# Patient Record
Sex: MALE
[De-identification: names, ages, dates, MRNs, and addresses within clinical notes are randomized per-mention and may not be internally consistent; named-entity substitution may affect disease eponyms.]

---

## 2017-01-23 ENCOUNTER — APPOINTMENT (OUTPATIENT)
Dept: HEMATOLOGY ONCOLOGY | Facility: CLINIC | Age: 32
End: 2017-01-23

## 2017-01-23 ENCOUNTER — LABORATORY RESULT (OUTPATIENT)
Age: 32
End: 2017-01-23

## 2017-01-23 VITALS
BODY MASS INDEX: 27.74 KG/M2 | DIASTOLIC BLOOD PRESSURE: 76 MMHG | HEIGHT: 68 IN | SYSTOLIC BLOOD PRESSURE: 135 MMHG | HEART RATE: 80 BPM | WEIGHT: 183 LBS | TEMPERATURE: 98.2 F

## 2017-01-24 ENCOUNTER — LABORATORY RESULT (OUTPATIENT)
Age: 32
End: 2017-01-24

## 2017-01-30 LAB
ALBUMIN SERPL ELPH-MCNC: 4.3 G/DL
ALP BLD-CCNC: 55 U/L
ALT SERPL-CCNC: 19 U/L
ANION GAP SERPL CALC-SCNC: 14 MMOL/L
AST SERPL-CCNC: 23 U/L
BILIRUB SERPL-MCNC: 0.2 MG/DL
BUN SERPL-MCNC: 24 MG/DL
CALCIUM SERPL-MCNC: 9.4 MG/DL
CHLORIDE SERPL-SCNC: 101 MMOL/L
CO2 SERPL-SCNC: 26 MMOL/L
CREAT SERPL-MCNC: 1.3 MG/DL
EPO SERPL-MCNC: 9.7 MIU/ML
GLUCOSE SERPL-MCNC: 97 MG/DL
HCT VFR BLD CALC: 48.9 %
HGB BLD-MCNC: 16.1 G/DL
MCHC RBC-ENTMCNC: 30.2 PG
MCHC RBC-ENTMCNC: 33 GM/DL
MCV RBC AUTO: 91.5 FL
PLATELET # BLD AUTO: 199 K/UL
POTASSIUM SERPL-SCNC: 4.5 MMOL/L
PROT SERPL-MCNC: 7.1 G/DL
RBC # BLD: 5.34 M/UL
RBC # FLD: 11.7 %
SODIUM SERPL-SCNC: 141 MMOL/L
TESTOST SERPL-MCNC: 619.2 NG/DL
WBC # FLD AUTO: 11.2 K/UL

## 2017-01-31 ENCOUNTER — APPOINTMENT (OUTPATIENT)
Dept: HEMATOLOGY ONCOLOGY | Facility: CLINIC | Age: 32
End: 2017-01-31

## 2017-01-31 VITALS
TEMPERATURE: 98.2 F | WEIGHT: 184 LBS | HEART RATE: 61 BPM | BODY MASS INDEX: 27.89 KG/M2 | SYSTOLIC BLOOD PRESSURE: 127 MMHG | DIASTOLIC BLOOD PRESSURE: 69 MMHG | HEIGHT: 68 IN

## 2017-01-31 DIAGNOSIS — Z86.69 PERSONAL HISTORY OF OTHER DISEASES OF THE NERVOUS SYSTEM AND SENSE ORGANS: ICD-10-CM

## 2017-01-31 DIAGNOSIS — R20.2 PARESTHESIA OF SKIN: ICD-10-CM

## 2017-01-31 DIAGNOSIS — B36.0 PITYRIASIS VERSICOLOR: ICD-10-CM

## 2017-01-31 DIAGNOSIS — N50.819 TESTICULAR PAIN, UNSPECIFIED: ICD-10-CM

## 2017-01-31 DIAGNOSIS — Z87.39 PERSONAL HISTORY OF OTHER DISEASES OF THE MUSCULOSKELETAL SYSTEM AND CONNECTIVE TISSUE: ICD-10-CM

## 2017-01-31 DIAGNOSIS — R10.30 LOWER ABDOMINAL PAIN, UNSPECIFIED: ICD-10-CM

## 2017-01-31 DIAGNOSIS — Z86.2 PERSONAL HISTORY OF DISEASES OF THE BLOOD AND BLOOD-FORMING ORGANS AND CERTAIN DISORDERS INVOLVING THE IMMUNE MECHANISM: ICD-10-CM

## 2020-03-23 ENCOUNTER — TRANSCRIPTION ENCOUNTER (OUTPATIENT)
Age: 35
End: 2020-03-23

## 2020-08-18 ENCOUNTER — TRANSCRIPTION ENCOUNTER (OUTPATIENT)
Age: 35
End: 2020-08-18

## 2024-06-06 ENCOUNTER — EMERGENCY (EMERGENCY)
Facility: HOSPITAL | Age: 39
LOS: 1 days | Discharge: HOME | End: 2024-06-06
Attending: STUDENT IN AN ORGANIZED HEALTH CARE EDUCATION/TRAINING PROGRAM
Payer: SELF-PAY

## 2024-06-06 VITALS
OXYGEN SATURATION: 93 % | HEART RATE: 99 BPM | TEMPERATURE: 98 F | DIASTOLIC BLOOD PRESSURE: 87 MMHG | RESPIRATION RATE: 17 BRPM | WEIGHT: 199.96 LBS | SYSTOLIC BLOOD PRESSURE: 136 MMHG | HEIGHT: 68 IN

## 2024-06-06 VITALS
OXYGEN SATURATION: 95 % | HEART RATE: 83 BPM | DIASTOLIC BLOOD PRESSURE: 75 MMHG | TEMPERATURE: 98 F | SYSTOLIC BLOOD PRESSURE: 119 MMHG | RESPIRATION RATE: 18 BRPM

## 2024-06-06 RX ORDER — ACETAMINOPHEN 500 MG
975 TABLET ORAL ONCE
Refills: 0 | Status: COMPLETED | OUTPATIENT
Start: 2024-06-06 | End: 2024-06-06

## 2024-06-06 RX ORDER — METHOCARBAMOL 500 MG/1
2 TABLET, FILM COATED ORAL
Qty: 16 | Refills: 0
Start: 2024-06-06 | End: 2024-06-07

## 2024-06-06 RX ORDER — METHOCARBAMOL 500 MG/1
1500 TABLET, FILM COATED ORAL ONCE
Refills: 0 | Status: COMPLETED | OUTPATIENT
Start: 2024-06-06 | End: 2024-06-06

## 2024-06-06 RX ORDER — LIDOCAINE 4 G/100G
1 CREAM TOPICAL ONCE
Refills: 0 | Status: COMPLETED | OUTPATIENT
Start: 2024-06-06 | End: 2024-06-06

## 2024-06-06 RX ORDER — IBUPROFEN 200 MG
600 TABLET ORAL ONCE
Refills: 0 | Status: COMPLETED | OUTPATIENT
Start: 2024-06-06 | End: 2024-06-06

## 2024-06-06 RX ADMIN — LIDOCAINE 1 PATCH: 4 CREAM TOPICAL at 15:55

## 2024-06-06 RX ADMIN — METHOCARBAMOL 1500 MILLIGRAM(S): 500 TABLET, FILM COATED ORAL at 15:55

## 2024-06-06 RX ADMIN — Medication 600 MILLIGRAM(S): at 15:55

## 2024-06-06 RX ADMIN — Medication 975 MILLIGRAM(S): at 15:55

## 2024-06-06 NOTE — ED PROVIDER NOTE - PATIENT PORTAL LINK FT
You can access the FollowMyHealth Patient Portal offered by [facility] by registering at the following website: [email or web address]. By joining [facility]’s FollowMyHealth portal, you will also be able to view your health information using other applications (apps) compatible with our system.

## 2024-06-06 NOTE — ED PROVIDER NOTE - ATTENDING APP SHARED VISIT CONTRIBUTION OF CARE
I, Guy Garcia, have performed a history and physical exam on this patient, and discussed their management with the FISH. I have fully participated in the care of this patient. I agree with the history, physical exam, and plan as documented by the FISH

## 2024-06-06 NOTE — ED PROVIDER NOTE - PROGRESS NOTE DETAILS
Chris Matos PA-C: No acute findings on x-rays.  No scaphoid fracture appreciated on x-ray but patient placed in thumb spica splint.  All results, discharge instructions and return precautions given.
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
## 2024-06-06 NOTE — ED PROVIDER NOTE - NSFOLLOWUPINSTRUCTIONS_ED_ALL_ED_FT
Follow-up with orthopedics within 7 days.  If you continue to experience low back pain further evaluation with MRI is recommended.  Please follow-up with the spine center at [phone].    [facility]  Orthopedic Surgery  [address] [place] [id number]  Phone: [phone]    Bring all printed results to discuss with your provider.    Keep splint clean, dry, and intact until you are seen by orthopedics.    Rest the affected area as much as possible. Elevate the area, use ice to reduce swelling and pain.    Pain can be managed with acetaminophen at 1000 mg every 6 hours and/or ibuprofen 600 mg every 6 hours.  Take Robaxin 1500 mg every 6 hours as prescribed for muscle spasms. Apply an over the counter lidocaine patch to the area, use as directed.     Continue to take all other medications as directed.    Return to the emergency room immediately if your symptoms worsen or persist, or if you have a high or concerning fever, new or worsening pain in the affected area, numbness or tingling of the affected area, inability to move the affected area, increased swelling, redness of affected area, or if any other concerning or questionable symptoms. Follow-up with hand specialist/orthopedics within 7 days.  If you continue to experience low back pain further evaluation with MRI is recommended.  Please follow-up with the spine center at [phone].    Antonieta Lui  Plastic Surgery  [address] [place] [address], [address]  [place] [address]  Phone: [phone]  Fax: [phone]    Bring all printed results to discuss with your provider.    Keep splint clean, dry, and intact until you are seen by orthopedics.    Rest the affected area as much as possible. Elevate the area, use ice to reduce swelling and pain.    Pain can be managed with acetaminophen at 1000 mg every 6 hours and/or ibuprofen 600 mg every 6 hours.  Take Robaxin 1500 mg every 6 hours as prescribed for muscle spasms. Apply an over the counter lidocaine patch to the area, use as directed.     Continue to take all other medications as directed.    Return to the emergency room immediately if your symptoms worsen or persist, or if you have a high or concerning fever, new or worsening pain in the affected area, numbness or tingling of the affected area, inability to move the affected area, increased swelling, redness of affected area, or if any other concerning or questionable symptoms.
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
## 2024-06-15 PROBLEM — Z00.00 ENCOUNTER FOR PREVENTIVE HEALTH EXAMINATION: Status: ACTIVE | Noted: 2024-06-15

## 2024-08-06 ENCOUNTER — NON-APPOINTMENT (OUTPATIENT)
Age: 39
End: 2024-08-06

## 2024-08-07 ENCOUNTER — APPOINTMENT (OUTPATIENT)
Dept: OTOLARYNGOLOGY | Facility: CLINIC | Age: 39
End: 2024-08-07

## 2024-08-07 PROCEDURE — 31231 NASAL ENDOSCOPY DX: CPT

## 2024-08-07 PROCEDURE — 99204 OFFICE O/P NEW MOD 45 MIN: CPT | Mod: 25

## 2024-08-07 PROCEDURE — 69210 REMOVE IMPACTED EAR WAX UNI: CPT

## 2024-08-07 NOTE — ASSESSMENT
[FreeTextEntry1] : cerumen cleared au Exam reveals significant nasal obstruction related nasal septal deviation, hypertrophic inferior nasal turbinates and allergic or nonspecific rhinitis. trial azelastine spray consider septoplasty may need better nasal airway for possible sleep apea and use of cpap

## 2024-08-07 NOTE — PROCEDURE
[FreeTextEntry6] : Indication for procedure:  Unable to adequately examine mid and posterior nasal cavity with anterior rhinoscopy The patient has chronic nasal congestion obstructed sleep  Sinus endoscope # 150 Nasal septum exam shows septal deviation to the rt at valve 3-4 plus The inferior nasal turbinates are moderately hypertrophic in size bilaterally. The sinus endoscope was introduced into the right nares exam right middle meatus reveals no mucopus or inflammation.  The right middle turbinate is WNL. The scope was advanced and the sphenoethmoid region was inspected. The superior meatus, superior turbinate and nasal vault are unremarkable.  The nasopharynx is unremarkable without inflammation or mass. The sinus endoscope was introduced into the left nares and exam left middle meatus reveals no mucopus or inflammation.  The left middle turbinate is WNL. The scope was advanced and the sphenoethmoid region was inspected. The left superior meatus and nasal vault are unremarkable.

## 2024-08-07 NOTE — HISTORY OF PRESENT ILLNESS
[de-identified] : undergoing work up for sleep apnea noted to have cerumen co ear plugging denies hearing loss neg pmh re ears co nasal obstruction worse on rt nasal trouble, no sinusitis

## 2024-08-07 NOTE — REVIEW OF SYSTEMS
[Seasonal Allergies] : seasonal allergies [Nasal Congestion] : nasal congestion [Problem Snoring] : problem snoring [Patient Intake Form Reviewed] : Patient intake form was reviewed [Negative] : Mouth and Throat [de-identified] : impacted wax  [de-identified] : has sleep apnea, has a slight smelling problem, mouth breather  [de-identified] : throat feels like its narrowing  [FreeTextEntry1] : Has seen a dr for sleep disorder and needs an at home sleep study